# Patient Record
Sex: FEMALE | Race: WHITE
[De-identification: names, ages, dates, MRNs, and addresses within clinical notes are randomized per-mention and may not be internally consistent; named-entity substitution may affect disease eponyms.]

---

## 2021-03-17 ENCOUNTER — HOSPITAL ENCOUNTER (OUTPATIENT)
Dept: HOSPITAL 47 - SLEEP | Age: 61
End: 2021-03-17
Attending: INTERNAL MEDICINE
Payer: COMMERCIAL

## 2021-03-17 DIAGNOSIS — Z98.51: ICD-10-CM

## 2021-03-17 DIAGNOSIS — R06.83: Primary | ICD-10-CM

## 2021-03-17 DIAGNOSIS — E66.3: ICD-10-CM

## 2021-03-17 DIAGNOSIS — Z98.890: ICD-10-CM

## 2021-03-17 DIAGNOSIS — Z78.0: ICD-10-CM

## 2021-03-17 DIAGNOSIS — Z87.39: ICD-10-CM

## 2021-03-17 DIAGNOSIS — J34.2: ICD-10-CM

## 2021-03-17 PROCEDURE — 99211 OFF/OP EST MAY X REQ PHY/QHP: CPT

## 2021-03-17 NOTE — CONS
CONSULTATION



DATE OF SERVICE:

03/17/2021



This 60-year-old lady has been evaluated in the sleep center for possible obstructive

sleep apnea-hypopnea syndrome.



HISTORY OF PRESENT ILLNESS/SLEEP-WAKE EVALUATION:

Patient usual sleep schedule from midnight or 1 a.m. until 9 a.m. She may wake up

around 5:30 and then she will go back to bed at 6. Usually no problems with falling

asleep.  No TV in bedroom.  She usually sleeps on the side position. According to her

, she has loud snoring and witnessed episodes of stopped breathing during the

sleep. She wakes up from sleep up to 3 times usually no nocturia.



In the morning patient wakes up tired and feels tiredness during the day. She denies

significant sleepiness.  [place] Sleepiness Scale is 3, but again she feels tired.



PAST MEDICAL HISTORY:

Positive for fibromyalgia, nasal septum deviation, questionable heart attack because

according to patient, her cardiac enzymes have been increased, but the following

evaluation after that was basically negative for coronary artery disease which include

cardiac catheterization.



PAST SURGICAL HISTORY:

Hernia repair, tubal ligation.



MEDICATIONS:

No medications at the present time.



SOCIAL HISTORY:

Negative for smoking or using alcohol.



FAMILY HISTORY:

Positive for cancer.



REVIEW OF SYSTEMS:

Multiple awakenings from sleep, feeling tiredness during the day.



PHYSICAL EXAMINATION:

GENERAL:  lady without distress.

VITAL SIGNS: /85, HR 88, RR 12, height 5 feet 9 inches, weight 197.2, temperature

98.7, oxygen saturation at room air 97%, body mass index 29.0.

HEENT: PERRLA, EOMI. Oropharynx moderately low position of soft palate, Mallampati 2-3.

NECK:  Neck is 15 inches in circumference.

LUNGS:  Clear to percussion and to auscultation.  Good air exchange.  No wheezing or

rhonchi.

HEART:  S1, S2 regular.  No murmurs, gallops, or rubs.

ABDOMEN:  Slightly obese.

EXTREMITIES: No clubbing or cyanosis.

CNS:  Awake, alert, and oriented X3.  Cranial nerves 2 to 7 intact.  There is no

fasciculation or atrophy. noted.  No focal deficits observed.



IMPRESSION:

1. Loud snoring, witnessed episodes of stopped breathing during sleep, multiple

    awakenings from sleep, possible obstructive sleep apnea-hypopnea syndrome.

2. Overweight, body mass index 29.0.

3. History of questionable heart attack by results of increased cardiac enzymes.

4. Menopause.

5. History of fibromyalgia.

6. Nasal septum deviation.

7. Status post hernia repair in 2012.

8. Status post tubal ligation in 2003.



PLAN:

1. Polysomnography for evaluation of patient's breathing during sleep.

2. CPAP/BiPAP titration if sleep study confirms obstructive sleep apnea-hypopnea

    syndrome.

3. Preferable position during sleep on the side.

4. No driving if patient feels any sleepiness.

5. I will see patient for follow up visit to explain results of testing and following

    plan.

Thank you very much for referring this patient for consultation.



Sincerely,





Jose Muhammad MD, PhD, FAASM

Diplomat of American [employer] of [employer] Specialties

American Board of Internal Medicine

Medical Director of [facility]





MMBENTONL / ARTURO: [id number] / Job#: [id number]

## 2021-09-02 ENCOUNTER — HOSPITAL ENCOUNTER (OUTPATIENT)
Dept: HOSPITAL 47 - EC | Age: 61
Setting detail: OBSERVATION
LOS: 1 days | Discharge: HOME | End: 2021-09-03
Attending: INTERNAL MEDICINE | Admitting: INTERNAL MEDICINE
Payer: COMMERCIAL

## 2021-09-02 VITALS — RESPIRATION RATE: 16 BRPM

## 2021-09-02 DIAGNOSIS — I45.2: ICD-10-CM

## 2021-09-02 DIAGNOSIS — Z88.0: ICD-10-CM

## 2021-09-02 DIAGNOSIS — Z86.010: ICD-10-CM

## 2021-09-02 DIAGNOSIS — R10.13: ICD-10-CM

## 2021-09-02 DIAGNOSIS — I25.2: ICD-10-CM

## 2021-09-02 DIAGNOSIS — R10.32: ICD-10-CM

## 2021-09-02 DIAGNOSIS — R11.0: ICD-10-CM

## 2021-09-02 DIAGNOSIS — Z98.890: ICD-10-CM

## 2021-09-02 DIAGNOSIS — R03.0: ICD-10-CM

## 2021-09-02 DIAGNOSIS — Z80.0: ICD-10-CM

## 2021-09-02 DIAGNOSIS — E66.9: ICD-10-CM

## 2021-09-02 DIAGNOSIS — Z88.1: ICD-10-CM

## 2021-09-02 DIAGNOSIS — R07.89: ICD-10-CM

## 2021-09-02 DIAGNOSIS — R10.11: Primary | ICD-10-CM

## 2021-09-02 DIAGNOSIS — R13.10: ICD-10-CM

## 2021-09-02 DIAGNOSIS — M54.9: ICD-10-CM

## 2021-09-02 DIAGNOSIS — Z80.51: ICD-10-CM

## 2021-09-02 DIAGNOSIS — M25.519: ICD-10-CM

## 2021-09-02 LAB
ALBUMIN SERPL-MCNC: 4.7 G/DL (ref 3.5–5)
ALP SERPL-CCNC: 75 U/L (ref 38–126)
ALT SERPL-CCNC: 19 U/L (ref 4–34)
AMYLASE SERPL-CCNC: 64 U/L (ref 30–110)
ANION GAP SERPL CALC-SCNC: 10 MMOL/L
APTT BLD: 23.7 SEC (ref 22–30)
AST SERPL-CCNC: 31 U/L (ref 14–36)
BASOPHILS # BLD AUTO: 0.1 K/UL (ref 0–0.2)
BASOPHILS NFR BLD AUTO: 1 %
BUN SERPL-SCNC: 16 MG/DL (ref 7–17)
CALCIUM SPEC-MCNC: 10.2 MG/DL (ref 8.4–10.2)
CHLORIDE SERPL-SCNC: 105 MMOL/L (ref 98–107)
CO2 SERPL-SCNC: 25 MMOL/L (ref 22–30)
EOSINOPHIL # BLD AUTO: 0.3 K/UL (ref 0–0.7)
EOSINOPHIL NFR BLD AUTO: 4 %
ERYTHROCYTE [DISTWIDTH] IN BLOOD BY AUTOMATED COUNT: 4.81 M/UL (ref 3.8–5.4)
ERYTHROCYTE [DISTWIDTH] IN BLOOD: 12.6 % (ref 11.5–15.5)
GLUCOSE SERPL-MCNC: 98 MG/DL (ref 74–99)
HCT VFR BLD AUTO: 44.5 % (ref 34–46)
HGB BLD-MCNC: 15.6 GM/DL (ref 11.4–16)
HYALINE CASTS UR QL AUTO: 1 /LPF (ref 0–2)
INR PPP: 1 (ref ?–1.2)
LIPASE SERPL-CCNC: 133 U/L (ref 23–300)
LYMPHOCYTES # SPEC AUTO: 2.6 K/UL (ref 1–4.8)
LYMPHOCYTES NFR SPEC AUTO: 31 %
MCH RBC QN AUTO: 32.3 PG (ref 25–35)
MCHC RBC AUTO-ENTMCNC: 34.9 G/DL (ref 31–37)
MCV RBC AUTO: 92.5 FL (ref 80–100)
MONOCYTES # BLD AUTO: 0.4 K/UL (ref 0–1)
MONOCYTES NFR BLD AUTO: 5 %
NEUTROPHILS # BLD AUTO: 4.8 K/UL (ref 1.3–7.7)
NEUTROPHILS NFR BLD AUTO: 57 %
PH UR: 5 [PH] (ref 5–8)
PLATELET # BLD AUTO: 343 K/UL (ref 150–450)
POTASSIUM SERPL-SCNC: 4.6 MMOL/L (ref 3.5–5.1)
PROT SERPL-MCNC: 8.1 G/DL (ref 6.3–8.2)
PT BLD: 10.5 SEC (ref 9–12)
RBC UR QL: <1 /HPF (ref 0–5)
SODIUM SERPL-SCNC: 140 MMOL/L (ref 137–145)
SP GR UR: 1.02 (ref 1–1.03)
SQUAMOUS UR QL AUTO: 1 /HPF (ref 0–4)
UROBILINOGEN UR QL STRIP: <2 MG/DL (ref ?–2)
WBC # BLD AUTO: 8.3 K/UL (ref 3.8–10.6)
WBC # UR AUTO: <1 /HPF (ref 0–5)

## 2021-09-02 PROCEDURE — 74176 CT ABD & PELVIS W/O CONTRAST: CPT

## 2021-09-02 PROCEDURE — 84484 ASSAY OF TROPONIN QUANT: CPT

## 2021-09-02 PROCEDURE — 85730 THROMBOPLASTIN TIME PARTIAL: CPT

## 2021-09-02 PROCEDURE — 36415 COLL VENOUS BLD VENIPUNCTURE: CPT

## 2021-09-02 PROCEDURE — 93005 ELECTROCARDIOGRAM TRACING: CPT

## 2021-09-02 PROCEDURE — 93306 TTE W/DOPPLER COMPLETE: CPT

## 2021-09-02 PROCEDURE — 99285 EMERGENCY DEPT VISIT HI MDM: CPT

## 2021-09-02 PROCEDURE — 96374 THER/PROPH/DIAG INJ IV PUSH: CPT

## 2021-09-02 PROCEDURE — 80053 COMPREHEN METABOLIC PANEL: CPT

## 2021-09-02 PROCEDURE — 81001 URINALYSIS AUTO W/SCOPE: CPT

## 2021-09-02 PROCEDURE — 85025 COMPLETE CBC W/AUTO DIFF WBC: CPT

## 2021-09-02 PROCEDURE — 80061 LIPID PANEL: CPT

## 2021-09-02 PROCEDURE — 96361 HYDRATE IV INFUSION ADD-ON: CPT

## 2021-09-02 PROCEDURE — 85610 PROTHROMBIN TIME: CPT

## 2021-09-02 PROCEDURE — 82150 ASSAY OF AMYLASE: CPT

## 2021-09-02 PROCEDURE — 83605 ASSAY OF LACTIC ACID: CPT

## 2021-09-02 PROCEDURE — 83690 ASSAY OF LIPASE: CPT

## 2021-09-02 RX ADMIN — CEFAZOLIN SCH MLS/HR: 330 INJECTION, POWDER, FOR SOLUTION INTRAMUSCULAR; INTRAVENOUS at 20:04

## 2021-09-02 NOTE — CT
EXAMINATION TYPE: CT abdomen pelvis wo con

 

DATE OF EXAM: 9/2/2021

 

COMPARISON: 4/11/2017

 

HISTORY: Generalized abdominal pain.

 

CT DLP: 734.1 mGycm

Automated exposure control for dose reduction was used.

 

TECHNIQUE:  Helical acquisition of images was performed from the lung bases through the pelvis.

 

FINDINGS: 

 

LUNG BASES: No significant abnormality is appreciated.

 

LIVER/GB: No significant abnormality is appreciated.

 

PANCREAS: No significant abnormality is seen.

 

SPLEEN: No significant abnormality is seen.

 

ADRENALS: No significant abnormality is seen.

 

KIDNEYS: No significant abnormality is seen.

 

FREE AIR:  No free air is visualized

 

RETROPERITONEAL ADENOPATHY:  None visualized

 

REPRODUCTIVE ORGANS: No significant abnormality is seen

 

URINARY BLADDER:  No significant abnormality is seen.

 

PELVIC ADENOPATHY:  None visualized.

 

OSSEOUS STRUCTURES:  No significant abnormality is seen.

 

BOWEL:  No significant abnormality is seen. Appendix is normal.

 

 

IMPRESSION: 

NO ACUTE CT PROCESS.

## 2021-09-02 NOTE — ED
General Adult HPI





- General


Chief complaint: Back Pain/Injury


Stated complaint: Shoulder Pain


Time Seen by Provider: 09/02/21 16:52


Source: patient, RN notes reviewed, old records reviewed


Mode of arrival: ambulatory


Limitations: no limitations





- History of Present Illness


Initial comments: 





60-year-old white female, well appearing, presents to the emergency room for 8 

days of back pain between her shoulder blades.  Patient states that the pain 

came on when she was at rest.  She has not seen anyone for this pain.  She also 

is complaining of left flank pain that radiates around to her left groin and 

epigastric pain.  She states that she had a colonoscopy and an endoscopy done 2 

years ago for abdominal pain and was told that she has a small stricture in her 

esophagus, she also had a polyp removed from her stomach.  She does have a 

history of abdominal surgery hernia repair with mesh.  She states that she did 

have a heart attack 18 years ago.  She denies any chest pain or shortness of 

breath.  She denies any fevers or nausea vomiting.  She has seen her doctor in 

the past and the cause of her left lower abdominal pain has not been determined.




-: days(s) (8)


Location: back


Severity scale (1-10): 9


Quality: aching


Consistency: constant


Improves with: none


Worsens with: none


Associated Symptoms: other (Epigastric abdominal pain, left flank pain, 

difficulty swallowing at times)





- Related Data


                                Home Medications











 Medication  Instructions  Recorded  Confirmed


 


Multivitamins, Thera [Multivitamin 1 tab PO DAILY 07/31/19 09/02/21





(formulary)]   











                                    Allergies











Allergy/AdvReac Type Severity Reaction Status Date / Time


 


erythromycin base Allergy  Nausea & Verified 09/02/21 19:52





[Erythromycin Base]   Vomiting &  





   Diarrhea  


 


Penicillins Allergy  Rash/Hives Verified 09/02/21 19:52














Review of Systems


ROS Statement: 


Those systems with pertinent positive or pertinent negative responses have been 

documented in the HPI.





ROS Other: All systems not noted in ROS Statement are negative.





Past Medical History


Past Medical History: No Reported History


History of Any Multi-Drug Resistant Organisms: None Reported


Past Surgical History: Hernia Repair, Tubal Ligation


Past Psychological History: No Psychological Hx Reported


Smoking Status: Never smoker


Past Alcohol Use History: None Reported


Past Drug Use History: None Reported





General Exam


Limitations: no limitations


General appearance: alert, in no apparent distress


Head exam: Present: atraumatic, normocephalic, normal inspection


Eye exam: Present: normal appearance, PERRL, EOMI.  Absent: scleral icterus, 

conjunctival injection, periorbital swelling


ENT exam: Present: normal exam, normal oropharynx, mucous membranes moist


Neck exam: Present: normal inspection, full ROM.  Absent: tenderness, meningi

smus, lymphadenopathy, thyromegaly


Respiratory exam: Present: normal lung sounds bilaterally.  Absent: respiratory 

distress, wheezes, rales, rhonchi, stridor, chest wall tenderness, accessory 

muscle use, decreased breath sounds


Cardiovascular Exam: Present: regular rate, normal rhythm, normal heart sounds. 

Absent: systolic murmur, diastolic murmur, rubs, gallop, clicks


GI/Abdominal exam: Present: soft, tenderness (Left lower quadrant)


Extremities exam: Present: normal inspection, full ROM, normal capillary refill.

 Absent: tenderness, pedal edema, joint swelling, calf tenderness


Back exam: Present: normal inspection, full ROM, CVA tenderness (L).  Absent: 

tenderness, CVA tenderness (R), muscle spasm, paraspinal tenderness, vertebral 

tenderness, rash noted


Neurological exam: Present: alert, oriented X3, CN II-XII intact


Psychiatric exam: Present: normal affect, normal mood


Skin exam: Present: warm, dry, intact, normal color.  Absent: rash, cyanosis, 

diaphoretic, erythema, petechiae, pallor, mottled





Course


                                   Vital Signs











  09/02/21 09/02/21 09/02/21





  15:40 17:25 17:27


 


Temperature 98.4 F  


 


Pulse Rate 80  77


 


Respiratory 18  18





Rate   


 


Blood Pressure 139/79  124/81


 


O2 Sat by Pulse 100 94 L 98





Oximetry   














  09/02/21 09/02/21 09/02/21





  17:30 18:00 18:30


 


Temperature   


 


Pulse Rate  85 


 


Respiratory   





Rate   


 


Blood Pressure 124/81 122/73 128/75


 


O2 Sat by Pulse 97  98





Oximetry   














  09/02/21





  19:00


 


Temperature 


 


Pulse Rate 80


 


Respiratory 18





Rate 


 


Blood Pressure 127/77


 


O2 Sat by Pulse 99





Oximetry 














EKG Findings





- EKG Results:


EKG: sinus rhythm (Ventricular rate of 64, WV interval 0.120, QRS of 0.132, QTC 

0.445;  a bifascicular block.  There is no old EKG to compare)





Medical Decision Making





- Medical Decision Making





CBC and electrolytes are within normal limits.  UA is negative for infection.  

Troponin is negative at 0.012 EKG shows sinus rhythm with a bifascicular block. 

There is no old EKG to compare.  CT the abdomen and pelvis shows no acute 

process.  Her appendix is normal.  There is no abnormality of the gallbladder, 

liver or pancreas.  Patient did have an acute MI 18 years ago.  She states that 

she did have an echo and they found no cause.  I did speak with Dr. Wayne she 

will be admitted with a cardiology consult.  Patient is pain-free at this time 

but is agreeable to staying in the hospital.





- Lab Data


Result diagrams: 


                                 09/02/21 17:20





                                 09/02/21 17:20


                                   Lab Results











  09/02/21 09/02/21 09/02/21 Range/Units





  17:20 17:20 17:20 


 


WBC  8.3    (3.8-10.6)  k/uL


 


RBC  4.81    (3.80-5.40)  m/uL


 


Hgb  15.6    (11.4-16.0)  gm/dL


 


Hct  44.5    (34.0-46.0)  %


 


MCV  92.5    (80.0-100.0)  fL


 


MCH  32.3    (25.0-35.0)  pg


 


MCHC  34.9    (31.0-37.0)  g/dL


 


RDW  12.6    (11.5-15.5)  %


 


Plt Count  343    (150-450)  k/uL


 


MPV  6.3    


 


Neutrophils %  57    %


 


Lymphocytes %  31    %


 


Monocytes %  5    %


 


Eosinophils %  4    %


 


Basophils %  1    %


 


Neutrophils #  4.8    (1.3-7.7)  k/uL


 


Lymphocytes #  2.6    (1.0-4.8)  k/uL


 


Monocytes #  0.4    (0-1.0)  k/uL


 


Eosinophils #  0.3    (0-0.7)  k/uL


 


Basophils #  0.1    (0-0.2)  k/uL


 


PT   10.5   (9.0-12.0)  sec


 


INR   1.0   (<1.2)  


 


APTT   23.7   (22.0-30.0)  sec


 


Sodium     (137-145)  mmol/L


 


Potassium     (3.5-5.1)  mmol/L


 


Chloride     ()  mmol/L


 


Carbon Dioxide     (22-30)  mmol/L


 


Anion Gap     mmol/L


 


BUN     (7-17)  mg/dL


 


Creatinine     (0.52-1.04)  mg/dL


 


Est GFR (CKD-EPI)AfAm     (>60 ml/min/1.73 sqM)  


 


Est GFR (CKD-EPI)NonAf     (>60 ml/min/1.73 sqM)  


 


Glucose     (74-99)  mg/dL


 


Plasma Lactic Acid Kyle     (0.7-2.0)  mmol/L


 


Calcium     (8.4-10.2)  mg/dL


 


Total Bilirubin     (0.2-1.3)  mg/dL


 


AST     (14-36)  U/L


 


ALT     (4-34)  U/L


 


Alkaline Phosphatase     ()  U/L


 


Troponin I     (0.000-0.034)  ng/mL


 


Total Protein     (6.3-8.2)  g/dL


 


Albumin     (3.5-5.0)  g/dL


 


Amylase     ()  U/L


 


Lipase     ()  U/L


 


Urine Color    Yellow  


 


Urine Appearance    Clear  (Clear)  


 


Urine pH    5.0  (5.0-8.0)  


 


Ur Specific Gravity    1.022  (1.001-1.035)  


 


Urine Protein    Negative  (Negative)  


 


Urine Glucose (UA)    Negative  (Negative)  


 


Urine Ketones    Negative  (Negative)  


 


Urine Blood    Trace H  (Negative)  


 


Urine Nitrite    Negative  (Negative)  


 


Urine Bilirubin    Negative  (Negative)  


 


Urine Urobilinogen    <2.0  (<2.0)  mg/dL


 


Ur Leukocyte Esterase    Negative  (Negative)  


 


Urine RBC    <1  (0-5)  /hpf


 


Urine WBC    <1  (0-5)  /hpf


 


Ur Squamous Epith Cells    1  (0-4)  /hpf


 


Hyaline Casts    1  (0-2)  /lpf


 


Urine Mucus    Moderate H  (None)  /hpf














  09/02/21 09/02/21 09/02/21 Range/Units





  17:20 17:20 17:20 


 


WBC     (3.8-10.6)  k/uL


 


RBC     (3.80-5.40)  m/uL


 


Hgb     (11.4-16.0)  gm/dL


 


Hct     (34.0-46.0)  %


 


MCV     (80.0-100.0)  fL


 


MCH     (25.0-35.0)  pg


 


MCHC     (31.0-37.0)  g/dL


 


RDW     (11.5-15.5)  %


 


Plt Count     (150-450)  k/uL


 


MPV     


 


Neutrophils %     %


 


Lymphocytes %     %


 


Monocytes %     %


 


Eosinophils %     %


 


Basophils %     %


 


Neutrophils #     (1.3-7.7)  k/uL


 


Lymphocytes #     (1.0-4.8)  k/uL


 


Monocytes #     (0-1.0)  k/uL


 


Eosinophils #     (0-0.7)  k/uL


 


Basophils #     (0-0.2)  k/uL


 


PT     (9.0-12.0)  sec


 


INR     (<1.2)  


 


APTT     (22.0-30.0)  sec


 


Sodium  140    (137-145)  mmol/L


 


Potassium  4.6    (3.5-5.1)  mmol/L


 


Chloride  105    ()  mmol/L


 


Carbon Dioxide  25    (22-30)  mmol/L


 


Anion Gap  10    mmol/L


 


BUN  16    (7-17)  mg/dL


 


Creatinine  0.76    (0.52-1.04)  mg/dL


 


Est GFR (CKD-EPI)AfAm  >90    (>60 ml/min/1.73 sqM)  


 


Est GFR (CKD-EPI)NonAf  87    (>60 ml/min/1.73 sqM)  


 


Glucose  98    (74-99)  mg/dL


 


Plasma Lactic Acid Kyle   1.4   (0.7-2.0)  mmol/L


 


Calcium  10.2    (8.4-10.2)  mg/dL


 


Total Bilirubin  0.8    (0.2-1.3)  mg/dL


 


AST  31    (14-36)  U/L


 


ALT  19    (4-34)  U/L


 


Alkaline Phosphatase  75    ()  U/L


 


Troponin I    <0.012  (0.000-0.034)  ng/mL


 


Total Protein  8.1    (6.3-8.2)  g/dL


 


Albumin  4.7    (3.5-5.0)  g/dL


 


Amylase  64    ()  U/L


 


Lipase  133    ()  U/L


 


Urine Color     


 


Urine Appearance     (Clear)  


 


Urine pH     (5.0-8.0)  


 


Ur Specific Gravity     (1.001-1.035)  


 


Urine Protein     (Negative)  


 


Urine Glucose (UA)     (Negative)  


 


Urine Ketones     (Negative)  


 


Urine Blood     (Negative)  


 


Urine Nitrite     (Negative)  


 


Urine Bilirubin     (Negative)  


 


Urine Urobilinogen     (<2.0)  mg/dL


 


Ur Leukocyte Esterase     (Negative)  


 


Urine RBC     (0-5)  /hpf


 


Urine WBC     (0-5)  /hpf


 


Ur Squamous Epith Cells     (0-4)  /hpf


 


Hyaline Casts     (0-2)  /lpf


 


Urine Mucus     (None)  /hpf














Disposition


Clinical Impression: 


 ACS (acute coronary syndrome)





Disposition: ADMITTED AS IP TO THIS HOSP


Decision Date: 09/02/21


Decision Time: 19:40

## 2021-09-03 VITALS — HEART RATE: 71 BPM

## 2021-09-03 VITALS — DIASTOLIC BLOOD PRESSURE: 77 MMHG | TEMPERATURE: 97.9 F | SYSTOLIC BLOOD PRESSURE: 134 MMHG

## 2021-09-03 RX ADMIN — CEFAZOLIN SCH: 330 INJECTION, POWDER, FOR SOLUTION INTRAMUSCULAR; INTRAVENOUS at 10:38

## 2021-09-03 NOTE — P.CRDCN
History of Present Illness


Consult date: 21


History of present illness: 





HISTORY OF PRESENT ILLNESS:  





This is a 60-year-old female with no significant past medical history.  Patient 

does not follow with a cardiologist.  We have been asked to see the patient in 

consultation for chest pain. Patient examined at the bedside. Patient reports 

she has been having pain for the past 8 days between her shoulder blades, 

epigastric region, and right upper quadrant pain. She currently denies chest pa

in or pressure. She reports some associated nausea. She reports a history of 

hernia repair with mesh and states she has been having some issues secondary to 

that. She reports she has had this time of pain on and off for the past 30 years

and believes it may be related to her gallbladder.





EKG reveals sinus mechanism. left axis deviation. right bundle branch block. No 

signs of acute ischemia.


Laboratory data: WBC 8.3.  Hemoglobin 15.6.  Platelet count 343.  Sodium 140.  

Potassium 4.6.  BUN 16.  Creatinine 0.76.  Lactic acid 1.4.  Troponin negative 

3.


Current home cardiac medications include none





REVIEW OF SYSTEMS: 


At the time of my exam:


CONSTITUTIONAL: Denies fever or chills.


HEENT: Denies blurred vision, vision changes, or eye pain. Denies hemoptysis 


CARDIOVASCULAR: Denies chest pain. Denies orthopnea. Denies PND. Denies 

palpitations


RESPIRATORY: Denies shortness of breath. 


GASTROINTESTINAL: Denies abdominal pain. Denies nausea or vomiting. 


HEMATOLOGIC: Denies bleeding disorders.


GENITOURINARY:  Denies any blood in urine.


SKIN: Denies pruitis. Denies rash.





PHYSICAL EXAM: 


VITAL SIGNS: Reviewed.


GENERAL: Well-developed in no acute distress. 


HEENT: Head is normocephalic. Pupils are equal, round. Sclerae anicteric. Mucous

membranes of the mouth are moist. Neck supple. No JVD or thyromegaly


LUNGS: Respirations even and unlabored. Lungs essentially clear to auscultation 

bilaterally.


HEART: Regular rate and rhythm.  S1 and S2 heard.


ABDOMEN: Soft. Nondistended. Nontender.


EXTREMITIES: Normal range of motion.  No clubbing or cyanosis.  Peripheral 

pulses intact.  No lower extremity edema


NEUROLOGIC: Awake and alert. Oriented x 3. 





ASSESSMENT: 


Chest pain, atypical, troponin negative x 3





PLAN: 


An acute coronary event has been ruled out


Obtain 2D echo to assess cardiac structure and function


Recommend GI/General surgery consult


Further recommendations pending patient course








Nurse practitioner note has been reviewed by physician. Signing provider agrees 

with the documented findings, assessment, and plan of care. 











Past Medical History


Past Medical History: Myocardial Infarction (MI)


Additional Past Medical History / Comment(s): Pt had MI 19 years ago.


Last Myocardial Infarction Date:: 


History of Any Multi-Drug Resistant Organisms: None Reported


Past Surgical History: Heart Catheterization, Hernia Repair, Tubal Ligation


Past Anesthesia/Blood Transfusion Reactions: No Reported Reaction


Past Psychological History: No Psychological Hx Reported


Smoking Status: Never smoker


Past Alcohol Use History: None Reported


Past Drug Use History: None Reported





- Past Family History


  ** Mother


Family Medical History: Cancer


Additional Family Medical History / Comment(s):  of colon cancer





  ** Father


Family Medical History: Cancer


Additional Family Medical History / Comment(s): Renal cancer





Medications and Allergies


                                Home Medications











 Medication  Instructions  Recorded  Confirmed  Type


 


Multivitamins, Thera [Multivitamin 1 tab PO DAILY 19 History





(formulary)]    








                                    Allergies











Allergy/AdvReac Type Severity Reaction Status Date / Time


 


erythromycin base Allergy  Nausea & Verified 21 19:52





[Erythromycin Base]   Vomiting &  





   Diarrhea  


 


Penicillins Allergy  Rash/Hives Verified 21 19:52














Physical Exam


Vitals: 


                                   Vital Signs











  Temp Pulse Pulse Resp BP BP Pulse Ox


 


 21 02:00    76  16   


 


 21 01:30  97.6 F   76  16   100/65  95


 


 21 21:54  98.1 F   68  16   136/71  98


 


 21 19:00   80   18  127/77   99


 


 21 18:30      128/75   98


 


 21 18:00   85    122/73  


 


 21 17:30      124/81   97


 


 21 17:27   77   18  124/81   98


 


 21 17:25        94 L


 


 21 15:40  98.4 F  80   18  139/79   100








                                Intake and Output











 21





 22:59 06:59 14:59


 


Intake Total 300 0 


 


Balance 300 0 


 


Intake:   


 


  Oral 300 0 


 


Other:   


 


  Voiding Method  Toilet 


 


  # Voids 1 1 


 


  Weight 83.915 kg  














Results





                                 21 17:20





                                 21 17:20


                                 Cardiac Enzymes











  21 Range/Units





  17:20 17:20 22:15 


 


AST  31    (14-36)  U/L


 


Troponin I   <0.012  <0.012  (0.000-0.034)  ng/mL














  21 Range/Units





  00:42 


 


AST   (14-36)  U/L


 


Troponin I  <0.012  (0.000-0.034)  ng/mL








                                   Coagulation











  21 Range/Units





  17:20 


 


PT  10.5  (9.0-12.0)  sec


 


APTT  23.7  (22.0-30.0)  sec








                                       CBC











  21 Range/Units





  17:20 


 


WBC  8.3  (3.8-10.6)  k/uL


 


RBC  4.81  (3.80-5.40)  m/uL


 


Hgb  15.6  (11.4-16.0)  gm/dL


 


Hct  44.5  (34.0-46.0)  %


 


Plt Count  343  (150-450)  k/uL








                          Comprehensive Metabolic Panel











  21 Range/Units





  17:20 


 


Sodium  140  (137-145)  mmol/L


 


Potassium  4.6  (3.5-5.1)  mmol/L


 


Chloride  105  ()  mmol/L


 


Carbon Dioxide  25  (22-30)  mmol/L


 


BUN  16  (7-17)  mg/dL


 


Creatinine  0.76  (0.52-1.04)  mg/dL


 


Glucose  98  (74-99)  mg/dL


 


Calcium  10.2  (8.4-10.2)  mg/dL


 


AST  31  (14-36)  U/L


 


ALT  19  (4-34)  U/L


 


Alkaline Phosphatase  75  ()  U/L


 


Total Protein  8.1  (6.3-8.2)  g/dL


 


Albumin  4.7  (3.5-5.0)  g/dL








                               Current Medications











Generic Name Dose Route Start Last Admin





  Trade Name Freq  PRN Reason Stop Dose Admin


 


Acetaminophen  650 mg  21 19:35 





  Acetaminophen Tab 325 Mg Tab  PO  





  Q6HR PRN  





  Mild Pain or Fever > 100.5  


 


Aspirin  325 mg  21 09:00 





  Aspirin 325 Mg Tab  PO  





  DAILY ALEXANDR  


 


Sodium Chloride  1,000 mls @ 75 mls/hr  21 19:45  21 20:04





  Saline 0.9%  IV   75 mls/hr





  .[id number] ALEXANDR   Administration


 


Naloxone HCl  0.2 mg  21 19:35 





  Naloxone 0.4 Mg/Ml 1 Ml Vial  IV  





  Q2M PRN  





  Opioid Reversal  


 


Nitroglycerin  0.4 mg  21 20:03 





  Nitroglycerin Sl Tabs 0.4 Mg Tab  SUBLINGUAL  





  Q5M PRN  





  Chest Pain  








                                Intake and Output











 21





 22:59 06:59 14:59


 


Intake Total 300 0 


 


Balance 300 0 


 


Intake:   


 


  Oral 300 0 


 


Other:   


 


  Voiding Method  Toilet 


 


  # Voids 1 1 


 


  Weight 83.915 kg  








                                        





                                 21 17:20 





                                 21 17:20

## 2021-09-03 NOTE — ECHOF
Referral Reason:chest pain



MEASUREMENTS

--------

HEIGHT: 175.3 cm

WEIGHT: 83.9 kg

BP: 100/65

IVSd:   1.1 cm     (0.6 - 1.1)

LVIDd:   4.3 cm     (3.9 - 5.3)

LVPWd:   1.1 cm     (0.6 - 1.1)

EDV(Teich):   83 ml

IVSs:   1.3 cm

LVIDs:   3.4 cm

LVPWs:   0.9 cm

%IVS Thck:   18 %

ESV(Teich):   49 ml

EF(Teich):   41 %

%FS:   20 %

SV(Teich):   34 ml

LA Diam:   3.7 cm     (2.7 - 3.8)

RVIDd:   2.4 cm     (< 3.3)

Ao Diam:   3.0 cm     (2.0 - 3.7)

LA Diam:   3.4 cm     (2.7 - 3.8)

AV Cusp:   1.9 cm     (1.5 - 2.6)

EPSS:   0.2 cm

MV E Mars:   0.66 m/s

MV DecT:   220 ms

MV Dec McDuffie:   3.0 m/s

MV A Mars:   0.72 m/s

MV E/A Ratio:   0.92 

MV PHT:   64 ms

TR Vmax:   2.16 m/s

TR maxP.73 mmHg

RAP:   5.00 mmHg

RVSP:   23.73 mmHg

MV EF SLOPE:   116.13 mm/s     (70 - 150)

MV EXCURSION:   18.05 mm     (> 18.000)







FINDINGS

--------

Sinus rhythm.

This was a technically good study.

LV size, wall thickness and systolic function are normal, with an EF greater than 55%.   The left keshia
tricular size is normal.

The right ventricle is normal in size.

The left atrial size is normal.

The right atrial size is normal.

The aortic valve is trileaflet, and appears structurally normal. No aortic stenosis or regurgitation.


There is trace mitral regurgitation.

Mild tricuspid regurgitation present.   Right ventricular systolic pressure is normal at < 35 mmHg.

There is no pulmonic regurgitation present.

There is no pericardial effusion.



CONCLUSIONS

--------

1. LV size, wall thickness and systolic function are normal, with an EF greater than 55%.

2. The left ventricular size is normal.

3. The right ventricle is normal in size.

4. The left atrial size is normal.

5. The right atrial size is normal.

6. The aortic valve is trileaflet, and appears structurally normal. No aortic stenosis or regurgitati
on.

7. There is trace mitral regurgitation.

8. Mild tricuspid regurgitation present.

9. There is no pericardial effusion.





SONOGRAPHER: Wanda Olson RDCS

## 2021-09-04 LAB
CHOLEST SERPL-MCNC: 222 MG/DL (ref 0–200)
HDLC SERPL-MCNC: 51 MG/DL (ref 40–60)
LDLC SERPL CALC-MCNC: 146.6 MG/DL (ref 0–131)
TRIGL SERPL-MCNC: 122 MG/DL (ref 0–149)
VLDLC SERPL CALC-MCNC: 24.4 MG/DL (ref 5–40)

## 2021-09-08 ENCOUNTER — HOSPITAL ENCOUNTER (OUTPATIENT)
Dept: HOSPITAL 47 - SLEEP | Age: 61
End: 2021-09-08
Attending: INTERNAL MEDICINE
Payer: COMMERCIAL

## 2021-09-08 DIAGNOSIS — G47.33: Primary | ICD-10-CM

## 2021-09-08 DIAGNOSIS — Z98.51: ICD-10-CM

## 2021-09-08 DIAGNOSIS — G47.36: ICD-10-CM

## 2021-09-08 DIAGNOSIS — J34.2: ICD-10-CM

## 2021-09-08 DIAGNOSIS — Z88.1: ICD-10-CM

## 2021-09-08 DIAGNOSIS — Z88.0: ICD-10-CM

## 2021-09-08 DIAGNOSIS — Z87.39: ICD-10-CM

## 2021-09-08 DIAGNOSIS — Z98.890: ICD-10-CM

## 2021-09-08 DIAGNOSIS — Z99.89: ICD-10-CM

## 2021-09-08 DIAGNOSIS — E66.9: ICD-10-CM

## 2021-09-08 NOTE — SFUN
SLEEP CENTER FOLLOW UP NOTE



DATE OF SERVICE:

09/08/2021



This 60-year-old lady has been followed in Sleep Center for treatment of 
obstructive

sleep apnea-hypopnea syndrome.



Recently the patient had a polysomnogram which showed extremely severe 
obstructive

sleep apnea-hypopnea syndrome with apnea-hypopnea index 60.3 and oxygen 
desaturation to

73.2%.  Subsequently the patient was started on treatment with CPAP and today is
her

first visit on therapy.



According to the patient, she is able to use her PAP equipment every night, and 
she

sleeps better with CPAP therapy and she feels better during the day.  [place]

Sleepiness Scale today is only 2, which is perfect.



I checked her CPAP unit. Range of the pressure is 5-18, average pressure 9.8 cm 
of

water, usage 29/30 nights for more than 4 hours with average usage 6.9 hours per
night.

Leak is 23 L/minute, which is borderline for full-face mask.  Apnea-hypopnea 
index is

only 2.7, which is perfect.



MEDICATIONS:

None.



PHYSICAL EXAMINATION:

GENERAL: Pleasant patient in no distress.

VITAL SIGNS: /90, HR 94, RR 15, weight 195, temperature 98.1, oxygen 
saturation

at room air 98.5.

HEENT: PERRLA, EOMI, evaluation of oropharynx showed tongue protrudes midline.

Moderately low position of soft palate; Mallampati II to III.

NECK:  Supple, no JVD.  Thyroid is not palpable. Neck measures 15 inches in

circumference.

LUNGS:  Clear to percussion and to auscultation.  Good air exchange.  No 
wheezing or

rhonchi.

HEART:  S1, S2 regular.  No murmurs, gallops, or rubs.

ABDOMEN:  Slightly obese.

EXTREMITIES: No clubbing or cyanosis.

CNS:  Awake, alert, and oriented X3.  Cranial nerves 2 to 7 intact.  There is no

fasciculation or atrophy. noted.  No focal deficits observed.



IMPRESSION:

1. Severe obstructive sleep apnea-hypopnea syndrome; apnea-hypopnea index 60.3 
with

    oxygen desaturation to 73.2%, under good control with CPAP treatment.  The 
patient

    demonstrated 100% compliance with treatment, benefitting from treatment.

2. Overweight; borderline to obesity.

3. History of questionable heart attack by results of increased cardiac enzymes.

4. History of fibromyalgia.

5. Nasal septum deviation.

6. Status post hernia repair in 2012.

7. Status post tubal ligation.



PLAN:

1. To replace air filter.

2. I discussed with the patient position of the machine and the necessity to 
make

    humidifier chamber dry after usage.

3. Patient will continue to use PAP equipment every night for the whole night.

4. Sleep hygiene with regular time in bed for at least 7-1/2 to 8 hours.

5. Precautions related to driving. No driving if feeling sleepiness.

6. I will maintain all necessary prescription for PAP supplies including mask, 
tube,

    filters.

7. Watching weight.

8. Patient will continue to use Vitera full-face mask.

9. Follow-up visit in 6 months or earlier if patient has any problems.



Thank you very much for allowing me to participate in the management of your 
patient.



Sincerely,







Jose Muhammad MD, PhD, FAASM

Diplomat of [employer] of [employer] Specialties

Sleep Medicine Board of American Board of Internal Medicine

Medical Director of [facility]





MMODL / LUZN: [id number] / Job#: [id number]

VANESSA

## 2021-10-02 NOTE — P.DS
Providers


Date of admission: 


09/02/21 19:45





Expected date of discharge: 09/03/21


Attending physician: 


Bola Wayne MD





Consults: 





                                        





09/02/21 19:36


Consult Physician Urgent 


   Consulting Provider: Chester Olivia


   Consult Reason/Comments: acs


   Do you want consulting provider notified?: Yes





09/03/21 13:37


Consult Physician Routine 


   Consulting Provider: Xavier Ocasio


   Consult Reason/Comments: Abdominal pain/ hernia repair/mesh


   Do you want consulting provider notified?: Yes











Primary care physician: 


Stated None





Hospital Course: 





60-year-old white female, well appearing, presents to the emergency room for 8 

days of back pain between her shoulder blades.  Patient states that the pain 

came on when she was at rest.  She has not seen anyone for this pain.  She also 

is complaining of left flank pain that radiates around to her left groin and 

epigastric pain.  She states that she had a colonoscopy and an endoscopy done 2 

years ago for abdominal pain and was told that she has a small stricture in her 

esophagus, she also had a polyp removed from her stomach.  She does have a 

history of abdominal surgery hernia repair with mesh.  She states that she did 

have a heart attack 18 years ago.  She denies any chest pain or shortness of 

breath.  She denies any fevers or nausea vomiting.  She has seen her doctor in 

the past and the cause of her left lower abdominal pain has not been determined.




CBC and electrolytes are within normal limits.  UA is negative for infection.  

Troponin is negative at 0.012 EKG shows sinus rhythm with a bifascicular block. 

There is no old EKG to compare.  CT the abdomen and pelvis shows no acute 

process.  Her appendix is normal.  There is no abnormality of the gallbladder, 

liver or pancreas.  Patient did have an acute MI 18 years ago.  She states that 

she did have an echo and they found no cause. 


Patient was admitted to the hospital for further cardiology evaluation





Patient is also recommended general surgery/GI consult for abdominal pain which 

patient declined because of resolution of pain


Patient was evaluated by cardiology and was recommended echocardiogram which 

revealed EF greater than 55% and normal ventricular size and function; cardi

ology did not recommend any further testing and patient was discharged home in a

stable condition


Patient Condition at Discharge: Good





Plan - Discharge Summary


Discharge Rx Participant: No


New Discharge Prescriptions: 


Continue


   Multivitamins, Thera [Multivitamin (formulary)] 1 tab PO DAILY


Discharge Medication List





Multivitamins, Thera [Multivitamin (formulary)] 1 tab PO DAILY 07/31/19 

[History]








Follow up Appointment(s)/Referral(s): 


Carlos Sherwood MD [STAFF PHYSICIAN] - 1-2 days


Discharge Disposition: HOME SELF-CARE

## 2021-10-02 NOTE — P.HPIM
History of Present Illness


H&P Date: 21


Chief Complaint: Back/shoulder pain/injury





60-year-old white female, well appearing, presents to the emergency room for 8 

days of back pain between her shoulder blades.  Patient states that the pain 

came on when she was at rest.  She has not seen anyone for this pain.  She also 

is complaining of left flank pain that radiates around to her left groin and 

epigastric pain.  She states that she had a colonoscopy and an endoscopy done 2 

years ago for abdominal pain and was told that she has a small stricture in her 

esophagus, she also had a polyp removed from her stomach.  She does have a 

history of abdominal surgery hernia repair with mesh.  She states that she did 

have a heart attack 18 years ago.  She denies any chest pain or shortness of 

breath.  She denies any fevers or nausea vomiting.  She has seen her doctor in 

the past and the cause of her left lower abdominal pain has not been determined.




CBC and electrolytes are within normal limits.  UA is negative for infection.  

Troponin is negative at 0.012 EKG shows sinus rhythm with a bifascicular block. 

There is no old EKG to compare.  CT the abdomen and pelvis shows no acute 

process.  Her appendix is normal.  There is no abnormality of the gallbladder, 

liver or pancreas.  Patient did have an acute MI 18 years ago.  She states that 

she did have an echo and they found no cause. 


Patient was admitted to the hospital for further cardiology evaluation





Review of Systems











REVIEW OF SYSTEMS: 


CONSTITUTIONAL: No fever, no malaise, no fatigue. 


HEENT: No recent visual problems or hearing problems. Denied any sore throat. 


CARDIOVASCULAR:  chest pain


PULMONARY: No shortness of breath, no cough, no hemoptysis. 


GASTROINTESTINAL: No diarrhea, no nausea, no vomiting, no abdominal pain. 


NEUROLOGICAL: No headaches, no weakness, no numbness. 


HEMATOLOGICAL: Denies any bleeding or petechiae. 


GENITOURINARY: Denies any burning micturition, frequency, or urgency. 


MUSCULOSKELETAL/RHEUMATOLOGICAL: Denies any joint pain, swelling, or any muscle 

pain. 


ENDOCRINE: Denies any polyuria or polydipsia. 





The rest of the 14-point review of systems is negative.





Past Medical History


Past Medical History: Myocardial Infarction (MI)


Additional Past Medical History / Comment(s): Pt had MI 19 years ago.


Last Myocardial Infarction Date:: 


History of Any Multi-Drug Resistant Organisms: None Reported


Past Surgical History: Heart Catheterization, Hernia Repair, Tubal Ligation


Past Anesthesia/Blood Transfusion Reactions: No Reported Reaction


Past Psychological History: No Psychological Hx Reported


Smoking Status: Never smoker


Past Alcohol Use History: None Reported


Past Drug Use History: None Reported





- Past Family History


  ** Mother


Family Medical History: Cancer


Additional Family Medical History / Comment(s):  of colon cancer





  ** Father


Family Medical History: Cancer


Additional Family Medical History / Comment(s): Renal cancer





Medications and Allergies


                                Home Medications











 Medication  Instructions  Recorded  Confirmed  Type


 


Multivitamins, Thera [Multivitamin 1 tab PO DAILY 19 History





(formulary)]    








                                    Allergies











Allergy/AdvReac Type Severity Reaction Status Date / Time


 


erythromycin base Allergy  Nausea & Verified 21 19:52





[Erythromycin Base]   Vomiting &  





   Diarrhea  


 


Penicillins Allergy  Rash/Hives Verified 21 19:52














Physical Exam


Vitals: 


                                   Vital Signs











  Temp Pulse Pulse Resp BP BP Pulse Ox


 


 21 08:00    71  16   


 


 21 07:00  98.2 F   71  16   146/77  96


 


 21 02:00    76  16   


 


 21 01:30  97.6 F   76  16   100/65  95


 


 21 21:54  98.1 F   68  16   136/71  98


 


 21 19:00   80   18  127/77   99


 


 21 18:30      128/75   98


 


 21 18:00   85    122/73  


 


 21 17:30      124/81   97


 


 21 17:27   77   18  124/81   98


 


 21 17:25        94 L


 


 21 15:40  98.4 F  80   18  139/79   100








                                Intake and Output











 21





 22:59 06:59 14:59


 


Intake Total 300 0 


 


Balance 300 0 


 


Intake:   


 


  Oral 300 0 


 


Other:   


 


  Voiding Method  Toilet Toilet


 


  # Voids 1 1 


 


  Weight 83.915 kg  














- Constitutional


General appearance: Present: average body habitus, cooperative, no acute 

distress


- EENT


Eyes: Present: anicteric sclerae, EOMI, PERRLA, normal appearance


ENT: Present: hearing grossly normal, normal oropharynx


Ears: bilateral: normal


- Neck


Neck: Present: normal ROM.  Absent: lymphadenopathy, rigidity, thyromegaly


Carotids: negative: bruit present


Thyroid: bilateral: normal size, negative: enlarged, nodule


- Respiratory


Respiratory: bilateral: CTA, negative: rales, rhonchi, wheezing


- Cardiovascular


Rhythm: regular


Heart sounds: normal: S1, S2


Abnormal Heart Sounds: Absent: systolic murmur, diastolic murmur


- Gastrointestinal


General gastrointestinal: Present: normal bowel sounds, soft.  Absent: 

distended, organomegaly, tenderness


- Genitourinary


Genitourinary Comment(s): deferred


- Integumentary


Integumentary: Present: normal turgor.  Absent: jaundiced, rash, ulcer


- Neurologic


Neurologic: Present: CNII-XII intact.  Absent: focal deficits


- Musculoskeletal


Musculoskeletal: Present: gait normal, strength equal bilaterally


- Psychiatric


Psychiatric: Present: A&O x's 3, appropriate affect, intact judgment & insight








Results


CBC & Chem 7: 


                                 21 17:20





                                 21 17:20


Labs: 


                  Abnormal Lab Results - Last 24 Hours (Table)











  21 Range/Units





  17:20 


 


Urine Blood  Trace H  (Negative)  


 


Urine Mucus  Moderate H  (None)  /hpf














Thrombosis Risk Factor Assmnt





- Choose All That Apply


Any of the Below Risk Factors Present?: Yes


Each Factor Represents 1 point: Age 41-60 years, Obesity (BMI >25)


Thrombosis Risk Factor Assessment Total Risk Factor Score: 2


Thrombosis Risk Factor Assessment Level: Low Risk





Assessment and Plan


Assessment: 





1.  Chest pain; history of MI in the past; we will monitor EKG and trend 

troponin; 2-D echo; consult cardiology for further recommendations





2.  Abdominal pain; etiology unclear; patient is recommended GI/general surgery 

consult





3.  Uncontrolled hypertension; patient doesn't have any diagnosis of 

hypertension in the past; we will monitor blood pressure closely and make 

further recommendations and initiate therapy prior to discharge as needed





DVT prophylaxis; SCDs


CODE STATUS; full code

## 2022-03-16 ENCOUNTER — HOSPITAL ENCOUNTER (OUTPATIENT)
Dept: HOSPITAL 47 - SLEEP | Age: 62
End: 2022-03-16
Attending: INTERNAL MEDICINE
Payer: COMMERCIAL

## 2022-03-16 DIAGNOSIS — Z88.1: ICD-10-CM

## 2022-03-16 DIAGNOSIS — Z88.0: ICD-10-CM

## 2022-03-16 DIAGNOSIS — Z99.89: ICD-10-CM

## 2022-03-16 DIAGNOSIS — G47.36: ICD-10-CM

## 2022-03-16 DIAGNOSIS — J34.2: ICD-10-CM

## 2022-03-16 DIAGNOSIS — G47.33: Primary | ICD-10-CM

## 2022-03-16 DIAGNOSIS — Z98.890: ICD-10-CM

## 2022-03-16 DIAGNOSIS — Z98.51: ICD-10-CM

## 2022-03-16 DIAGNOSIS — E66.9: ICD-10-CM

## 2022-03-16 DIAGNOSIS — Z87.39: ICD-10-CM

## 2022-03-16 NOTE — SFUN
SLEEP CENTER FOLLOW UP NOTE



DATE OF SERVICE:

03/16/2022



This 61-year-old lady has been followed in Sleep Center for treatment of obstructive

sleep apnea-hypopnea syndrome.



The patient continues to use her CPAP equipment every night, had one episode when water

came into her mask.  She is using a Vitera full-face mask, medium size.



[place] Sleepiness Scale today is 1, which is absolutely normal.



I checked her CPAP unit.  Range of the pressure is 5 to 18, automatic, average 10.4 cm

of water. Usage is 30/30 nights for more than 4 hours, average 6.8 hours per night.

Leak is 28 L/minute, which is borderline.  Apnea-hypopnea index is 2.5, which is

normal.



MEDICATIONS:

Vitamins.



PHYSICAL EXAMINATION:

GENERAL: Pleasant patient in no distress.

VITAL SIGNS: /81, HR 86, RR 15, weight 199.6 pounds, temperature 97.3, oxygen

saturation at room air 98%.

HEENT: PERRLA, EOMI, evaluation of oropharynx showed tongue protrudes midline.

Moderately low position of soft palate; Mallampati II to III.

NECK:  Supple, no JVD.  Thyroid is not palpable.

LUNGS:  Clear to percussion and to auscultation.  Good air exchange.  No wheezing or

rhonchi.

HEART:  S1, S2 regular.  No murmurs, gallops, or rubs.

ABDOMEN:  Slightly obese.

EXTREMITIES: No clubbing or cyanosis.

CNS:  Awake, alert, and oriented X3.  Cranial nerves 2 to 7 intact.  There is no

fasciculation or atrophy. noted.  No focal deficits observed.



IMPRESSION:

1. Severe obstructive sleep apnea-hypopnea syndrome.  Original apnea-hypopnea index

    60.3 with oxygen desaturation 73.2%.  The patient demonstrated 100% compliance with

    treatment, normal respiration on CPAP, benefitting from treatment.

2. Mild obesity.

3. History of questionable heart attack many years ago.  One set of cardiac enzymes

    was increased.

4. History of fibromyalgia.

5. Nasal septum deviation.

6. Status post hernia repair in 2012.

7. Status post tubal ligation.



PLAN:

1. Patient will continue to use PAP equipment every night for the whole night.

2. Sleep hygiene with regular time in bed for at least 7-1/2 to 8 hours.

3. Precautions related to driving. No driving if feeling sleepiness.

4. I will maintain all necessary prescription for PAP supplies including mask, tube,

    filters.

5. Watching weight.

6. Follow-up visit in one year or earlier if patient has any problems.



Thank you very much for allowing me to participate in the management of your patient.



Sincerely,







Jose Muhammad MD, PhD, FAASM

Diplomat of American Board of Medical Specialties

Sleep Medicine Board of American Board of Internal Medicine

Medical Director of [facility]





MMNADYA / LUZN: [id number] / Job#: [id number]

## 2023-01-18 ENCOUNTER — HOSPITAL ENCOUNTER (EMERGENCY)
Dept: HOSPITAL 47 - EC | Age: 63
Discharge: HOME | End: 2023-01-18
Payer: COMMERCIAL

## 2023-01-18 VITALS — TEMPERATURE: 97.6 F | HEART RATE: 80 BPM | DIASTOLIC BLOOD PRESSURE: 85 MMHG | SYSTOLIC BLOOD PRESSURE: 144 MMHG

## 2023-01-18 VITALS — RESPIRATION RATE: 16 BRPM

## 2023-01-18 DIAGNOSIS — M50.10: ICD-10-CM

## 2023-01-18 DIAGNOSIS — Z88.1: ICD-10-CM

## 2023-01-18 DIAGNOSIS — S00.03XA: Primary | ICD-10-CM

## 2023-01-18 DIAGNOSIS — I25.2: ICD-10-CM
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
## 2023-01-18 DIAGNOSIS — Z88.0: ICD-10-CM

## 2023-01-18 PROCEDURE — 72125 CT NECK SPINE W/O DYE: CPT

## 2023-01-18 PROCEDURE — 70450 CT HEAD/BRAIN W/O DYE: CPT

## 2023-01-18 PROCEDURE — 99283 EMERGENCY DEPT VISIT LOW MDM: CPT

## 2023-01-18 NOTE — ED
Head Injury HPI





- General


Chief complaint: Head Injury


Stated complaint: Head Injury


Time Seen by Provider: 23 07:02


Source: patient, RN notes reviewed


Mode of arrival: ambulatory


Limitations: no limitations





- History of Present Illness


Initial comments: 


62-year-old female presents emergency Department with chief complaint of head 

injury.  Patient states a few weeks ago she was struck in the frontal aspect of 

her head by her dog states that she had significant for head swelling and 

bruising that went into her periorbital region.  Patient states that yesterday 

she was standing up struck her head on a cabinet.  Patient states she's been 

having increasing headache, just feels off did not feel well.  Patient denies 

syncopal episode.  Patient denies any blood thinners.  Patient states that she 

that she has some tingling in her fingers on her right arm does not have 

currently no focal weakness denies any associated complaints. 








- Related Data


                                Home Medications











 Medication  Instructions  Recorded  Confirmed


 


Multivitamins, Thera [Multivitamin 1 tab PO DAILY 19





(formulary)]   











Allergies/Adverse reactions: 


                                    Allergies











Allergy/AdvReac Type Severity Reaction Status Date / Time


 


erythromycin base Allergy  Nausea & Verified 23 06:55





[Erythromycin Base]   Vomiting &  





   Diarrhea  


 


Penicillins Allergy  Rash/Hives Verified 23 06:55














Review of Systems


ROS Statement: 


Those systems with pertinent positive or pertinent negative responses have been 

documented in the HPI.





ROS Other: All systems not noted in ROS Statement are negative.





Past Medical History


Past Medical History: Myocardial Infarction (MI)


Additional Past Medical History / Comment(s): Pt had MI 19 years ago.


Last Myocardial Infarction Date:: 


History of Any Multi-Drug Resistant Organisms: None Reported


Past Surgical History: Heart Catheterization, Hernia Repair, Tubal Ligation


Past Anesthesia/Blood Transfusion Reactions: No Reported Reaction


Past Psychological History: No Psychological Hx Reported


Smoking Status: Never smoker


Past Alcohol Use History: None Reported


Past Drug Use History: None Reported





- Past Family History


  ** Mother


Family Medical History: Cancer


Additional Family Medical History / Comment(s):  of colon cancer





  ** Father


Family Medical History: Cancer


Additional Family Medical History / Comment(s): Renal cancer





General Exam


Limitations: no limitations


General appearance: alert, in no apparent distress


Head exam: Present: atraumatic, normocephalic, normal inspection


Eye exam: Present: normal appearance, PERRL, EOMI.  Absent: scleral icterus, 

conjunctival injection, periorbital swelling


ENT exam: Present: normal exam, mucous membranes moist


Neck exam: Present: normal inspection, full ROM.  Absent: tenderness, 

meningismus, lymphadenopathy


Respiratory exam: Present: normal lung sounds bilaterally.  Absent: respiratory 

distress, wheezes, rales, rhonchi, stridor


Cardiovascular Exam: Present: regular rate, normal rhythm, normal heart sounds. 

Absent: systolic murmur, diastolic murmur, rubs, gallop, clicks


Neurological exam: Present: alert, oriented X3, CN II-XII intact, reflexes 

normal.  Absent: motor sensory deficit


Skin exam: Present: warm, dry, intact, normal color.  Absent: rash





Course


                                   Vital Signs











  23





  06:55 07:26


 


Temperature 98.6 F 98 F


 


Pulse Rate 88 81


 


Respiratory 15 16





Rate  


 


Blood Pressure 126/74 150/82


 


O2 Sat by Pulse 98 97





Oximetry  














Medical Decision Making





- Medical Decision Making


Was pt. sent in by a medical professional or institution (Dr. PA, NP, urgent 

care, hospital, or nursing home...) When possible be specific


@  -No


Did you speak to anyone other than the patient for history (EMS, parent, family,

police, friend...)? What history was obtained from this source 


@  -No


Did you review nursing and triage notes (agree or disagree)?  Why? 


@  -I reviewed and agree with nursing and triage notes


Were old charts reviewed (outside hosp., previous admission, EMS record, old 

EKG, old radiological studies, urgent care reports/EKG's, nursing home records)?

Report findings 


@  -No old charts were reviewed


Differential Diagnosis (chest pain, altered mental status, abdominal pain women,

abdominal pain men, vaginal bleeding, weakness, fever, dyspnea, syncope, 

headache, dizziness, GI bleed, back pain, seizure, CVA, palpatations, mental 

health)? 


@  -Scalp contusion, closed head injury, intracranial hemorrhage, cervical disc 

herniation, skull fracture, this list is not all inclusive.


EKG interpreted by me (3pts min.).


@  -None


X-rays interpreted by me (1pt min.).


@  -None done


CT interpreted by me (1pt min.).


@  -Ct cspine the brain, C-spine was performed shows cervical degenerative 

changes, no intracranial hemorrhage


U/S interpreted by me (1pt. min.).


@  -None done


What testing was considered but not performed or refused? (CT, X-rays, U/S, 

labs)? Why?


@  -None


What meds were considered but not given or refused? Why?


@  -None


Did you discuss the management of the patient with other professionals 

(professionals i.e. Dr., PA, NP, lab, RT, psych nurse, , , 

teacher, , )? Give summary


@  -No


Was smoking cessation discussed for >3mins.?


@  -No


Was critical care preformed (if so, how long)?


@  -No


Were there social determinants of health that impacted care today? How? (Homeles

sness, low income, unemployed, alcoholism, drug addiction, transportation, low 

edu. Level, literacy, decrease access to med. care, assisted, rehab)?


@  -No


Was there de-escalation of care discussed even if they declined (Discuss DNR or 

withdrawal of care, Hospice)? DNR status


@  -No


What co-morbidities impacted this encounter? (DM, HTN, Smoking, COPD, CAD, 

Cancer, CVA, ARF, Chemo, Hep., AIDS, mental health diagnosis, sleep apnea, 

morbid obesity)?


@  -None


Was patient admitted / discharged? Hospital course, mention meds given and 

route, prescriptions, significant lab abnormalities, going to OR and other 

pertinent info.


@  -Discharged - patient CT did not reveal any acute changes patient has 

degenerative changes of the cervical spine may be contributory airman radicular 

symptoms.  Patient has scalp contusion, closed head injury patient was 

discharged in stable condition return parameters discussed.


Undiagnosed new problem with uncertain prognosis?


@  -No


Drug Therapy requiring intensive monitoring for toxicity (Heparin, Nitro, 

Insulin, Cardizem)?


@  -No


Were any procedures done?


@  -No


Diagnosis/symptom?


@  -Closed head injury


Acute, or Chronic, or Acute on Chronic?


@  -Acute


Uncomplicated (without systemic symptoms) or Complicated (systemic symptoms)?


@  -Uncomplicated


Side effects of treatment?


@  -No


Exacerbation, Progression, or Severe Exacerbation?


@  -No


Poses a threat to life or bodily function? How? (Chest pain, USA, MI, pneumonia,

PE, COPD, DKA, ARF, appy, cholecystitis, CVA, Diverticulitis, Homicidal, 

Suicidal, threat to staff... and all critical care pts)


@  -No








Disposition


Clinical Impression: 


 Closed head injury, Contusion of scalp, Cervical radiculopathy due to 

degenerative joint disease of spine





Disposition: HOME SELF-CARE


Condition: Stable


Instructions (If sedation given, give patient instructions):  Head Injury (ED)


Additional Instructions: 


Please return to the Emergency Department if symptoms worsen or any other 

concerns.


Is patient prescribed a controlled substance at d/c from ED?: No


Referrals: 


Gee Pereira MD [Primary Care Provider] - 1-2 days


Time of Disposition: 08:26

## 2023-01-18 NOTE — CT
EXAMINATION TYPE: CT brain [facility] con

 

DATE OF EXAM: 1/18/2023

 

COMPARISON: None

 

HISTORY: 62-year-old female Head injury and sore neck

 

CT DLP: 1474.6 mGycm

Automated exposure control for dose reduction was used.

 

Technique:  Examination of the head was done in axial plane without intravenous contrast. Coronal and
 sagittal reconstructions performed.

 

CT of the cervical spine was obtained in axial plane without intravenous injection of  contrast mater
ial.  Coronal and sagittal reformatted images were obtained from the axial views for evaluation of  f
ractures, spinal alignment and canal.

 

 

FINDINGS:

 

Head:

There is no evidence of  acute intracranial hemorrhage, acute ischemic changes, mass, mass-effect, or
 extra-axial fluid collection.  There is no effacement of cerebral sulci or basal subarachnoid cister
ns.  There is no hydrocephalus.  There is no midline shift.  Gray-white matter distinction is preserv
ed.

 

There are some partial volume averaging artifact at the lateral floor of the left anterior cranial fo
ssa.

 

Paranasal sinuses and mastoid air cells are pneumatized. Scattered mild mucosal thickening left sphen
oid sinus and posterior right ethmoid air cells. Orbits and globes are intact.

 

 

Cervical spine:

No craniocervical junction abnormality, predental space widening, or prevertebral soft tissue swellin
g. Degenerative change at the C1 dens articulation.

 

Mild to moderate degenerative disc disease mid to lower cervical spine.

 

Degenerative trace grade 1 anterolisthesis C7-T1 and T1-T2 and trace grade 1 retrolisthesis C6-C7.

 

Disc ossified complex likely contribute to mild narrowing of the spinal canal at C5-C6. Assessment of
 the spinal canal below this level is limited due to artifact from patient's shoulders.

 

Multilevel facet and uncovertebral joint arthropathy is present.

 

This contributes to very minimal mild neuroforaminal stenoses, greatest at C5-C6 and C6-C7. No acute 
fracture of the cervical spine.

 

Sagittal and coronal reformatted images confirm above findings.

 

 

COMBINED IMPRESSION:

1. No acute intracranial abnormality seen.

2. No acute fracture of the cervical spine. Mild to moderate multilevel spondylotic change. Degenerat
shira grade 1 spondylolisthesis C6-C7, C7-T1, and T1-T2.

## 2023-03-15 ENCOUNTER — HOSPITAL ENCOUNTER (OUTPATIENT)
Dept: HOSPITAL 47 - SLEEP | Age: 63
End: 2023-03-15
Payer: COMMERCIAL

## 2023-03-15 DIAGNOSIS — G47.33: Primary | ICD-10-CM

## 2023-03-15 DIAGNOSIS — J34.2: ICD-10-CM

## 2023-03-15 DIAGNOSIS — E66.9: ICD-10-CM

## 2023-03-15 DIAGNOSIS — M79.7: ICD-10-CM

## 2023-03-15 DIAGNOSIS — Z88.0: ICD-10-CM

## 2023-03-15 DIAGNOSIS — Z99.89: ICD-10-CM

## 2023-03-15 DIAGNOSIS — Z88.1: ICD-10-CM

## 2023-03-15 PROCEDURE — 99212 OFFICE O/P EST SF 10 MIN: CPT

## 2023-03-15 NOTE — P.PN
Subjective


DATE: 03/15/2023





FOLLOW UP VISIT.





Patient with obstructive sleep apnea hypopnea syndrome return to sleep center 

for follow-up visit. 


Information from previous visit have been reviewed.  


 Patient is using PAP equipment every night for the whole night, getting PAP 

supplies in time.


Sometimes patient feels that the pressure is too high.  Oxford sleepiness scale

is 1, which is perfect.


I checked information from PAP unit. 


PAP unit pressure 5-18, average 9.4 cm H2O. 


Usage is 100 % for more then 4 hours, average 7 hours per night. 


Leak is 27.7 l/m, which is in acceptable range. 


Apnea Hypopnea Index is 2.2, which is normal.  





MEDICATIONS: None


                          


During physical exam:


GENERAL: A pleasant patient without any distress. 


VITAL SIGNS: /84, HR 80, RR 16, weight 200.6, temperature 98.4, oxygen 

saturation at room air 97 % .


HEENT: PERRLA, EOMI.low position of soft palate, Mallapati 2-3.


NECK: Supple. No JVD. 


LUNGS: Clear to percussion and to auscultation. Good air exchange. No wheezing 

or rhonchi. 


HEART: S1, S2 regular. 


ABDOMEN: Soft and nontender.[]  


EXTREMITIES: No clubbing or cyanosis. 


CNS: Awake, alert, and oriented x3.  No focal deficit. 





Impressions:


1.  Obstructive sleep apnea-hypopnea syndrome. Patient demonstrated great 

compliance with treatment, benefiting from treatment.


2.  History of questionable heart attack many years ago, at that time one set of

 cardiac enzymes was increased.


3.  History of fibromyalgia.


4.  Nasal septum deviation.


5.  Status post hernia repair.


6.  mild obesity.








Plan:


1.  Continue using PAP equipment every night for the whole night. I adjusted 

pressure in CPAP unit to the range 5-12 cm of water .


2.  To change air filter at least 1-2 times per month.


3.  PAP unit should stay lower then position of the head.


4.  Advised patient to remove all remaining water from humidifier canister daily

 and make it dry after each usage. Refill canister with fresh distilled water 

before each usage. 


5.  Sleep hygiene with regular time in bed for at least 8 hours.


6.  Precautions related to driving. No driving if feel any sleepiness.


7.  I will maintain prescription for PAP supplies including mask, tube, filters.


8. Follow up visit in 6 months or earlier if patient has any problems.


9. Watching weight.








Thank you very much for allowing me to participate in the management of your 

patient.








Jose Muhammad MD, PhD, FAASM.


Diplomat of [employer] of [employer] Medicine,


Sleep Medicine Board by [employer] of [employer] Medicine


Medical Director of [facility]

## 2025-07-09 ENCOUNTER — HOSPITAL ENCOUNTER (OUTPATIENT)
Dept: HOSPITAL 47 - 3 N SLEEP | Age: 65
End: 2025-07-09
Payer: COMMERCIAL

## 2025-07-09 VITALS
HEART RATE: 75 BPM | SYSTOLIC BLOOD PRESSURE: 147 MMHG | RESPIRATION RATE: 14 BRPM | TEMPERATURE: 98.1 F | DIASTOLIC BLOOD PRESSURE: 87 MMHG

## 2025-07-09 DIAGNOSIS — E66.9: ICD-10-CM

## 2025-07-09 DIAGNOSIS — Z99.89: ICD-10-CM

## 2025-07-09 DIAGNOSIS — Z98.890: ICD-10-CM

## 2025-07-09 DIAGNOSIS — Z88.1: ICD-10-CM

## 2025-07-09 DIAGNOSIS — G47.33: Primary | ICD-10-CM

## 2025-07-09 DIAGNOSIS — J34.2: ICD-10-CM

## 2025-07-09 DIAGNOSIS — Z86.69: ICD-10-CM

## 2025-07-09 DIAGNOSIS — Z88.0: ICD-10-CM

## 2025-07-09 PROCEDURE — 99212 OFFICE O/P EST SF 10 MIN: CPT
